# Patient Record
(demographics unavailable — no encounter records)

---

## 2024-12-05 NOTE — HISTORY OF PRESENT ILLNESS
[de-identified] : 59 y/o F with HLD, pre-DM, obesity, and papillary thyroid cancer s/p excision with hypothyroidism, presenting for f/u. Feeling well with no complaints.

## 2024-12-05 NOTE — INTERPRETER SERVICES
[Other: ______] : provided by ALBERTO [Time Spent: ____ minutes] : Total time spent using  services: [unfilled] minutes. The patient's primary language is not English thus required  services. [Interpreters_IDNumber] : [id number] [Interpreters_FullName] : Ritesh [TWNoteComboBox1] : Ecuadorean

## 2024-12-05 NOTE — ASSESSMENT
[FreeTextEntry1] : . 61 y/o F with HLD, pre-DM, obesity, and papillary thyroid cancer s/p excision with hypothyroidism, presenting for f/u. HPI as above.  # HTN; controlled - home SBP 120s/70-80 - low salt diet, exercise as tolerated - c/w losartan 50mg po daily  # HLD - f/u lipid panel after increase dose - c/w atorvastatin 40mg po daily for now - counselled on diet/exercise/weight loss  # pre-DM - f/u A1c - counselled on diet/exercise/weight loss  # obesity - seen by dietician - counselled on diet/exercise/weight loss - pt amenable to medical weight loss eval  # Osteopenia # Vit D insufficiency - weight bearing exercises as tolerated; already seen by PT - daily calcium intake 1200mg via diet/supplement - Vit D 1000 IU daily - fall precautions  # Papillary thyroid cancer s/p excision with hypothyroidism - endo f/u encouraged - c/w current dose synthroid for now  # Intermittent palpitations; resolved # atypical CP; resolved  # L knee pain s/p fall; improved - Xray with small osteophyte, no fracture - seen by ortho, rec conservative management with PT and NSAIDs for now  # Chronic R foot/ankle pain; improved - podiatry eval appreciated, pt has CAM boot  # HCM - 1/2024 mammo BIRADS 3; repeat imaging 7/2024 stable with rec f/u routine imaging 1/2025 - GI eval scheduled, last scope 2015 and told to repeat - 2/2024 pap wnl - s/p flu vaccine  f/u in 4 months.

## 2025-01-06 NOTE — ASSESSMENT
[FreeTextEntry1] : 59 y/o F with HLD, pre-DM, obesity, and papillary thyroid cancer s/p total thyroidectomy with postop hypothyroidism. Here for postop management.  Low risk PTC S/P total thyroidectomy and VALENTINE Postop hypothyroidism Postop hypoparathyroidism? Labs and imaging as above. -Repeat thyroid US now -Check CMP, phos, mag, PTH now as may have postop hypopara. May need calcium supplementation. -TFTs at likely goal though still awaiting US. TG undetectable with negative TG ab. -Continue levothyroxine 112mcg daily x6 days per week. TSH goal will likely be 0.5-2.0 based on response to cancer therapy (US and TG)  Pre-DM HTN HLD Medications include 800 units vitamin D3 daily with adequate 25-hydroxy vitamin D, losartan 50 mg daily and atorvastatin 40 mg daily. Continue these. F/u with PCP  RTC 6 months  Jj Jimenez DO.

## 2025-01-06 NOTE — PHYSICAL EXAM
[Alert] : alert [Well Nourished] : well nourished [No Acute Distress] : no acute distress [EOMI] : extra ocular movement intact [No Proptosis] : no proptosis [No Lid Lag] : no lid lag [No Respiratory Distress] : no respiratory distress [No Accessory Muscle Use] : no accessory muscle use [Normal Rate and Effort] : normal respiratory rate and effort

## 2025-01-06 NOTE — REASON FOR VISIT
[Follow - Up] : a follow-up visit [Thyroid Cancer] : thyroid cancer [Other: ______] : provided by ALBERTO [Interpreters_IDNumber] : [id number] [Interpreters_FullName] : Brigitte [TWNoteComboBox1] : [place] Islander

## 2025-01-06 NOTE — HISTORY OF PRESENT ILLNESS
[FreeTextEntry1] : 61 y/o F with HLD, pre-DM, obesity, and papillary thyroid cancer s/p total thyroidectomy with postop hypothyroidism. Here for postop management.  Patient underwent total thyroidectomy with central neck dissection in December 2016.  Surgical pathology showed unifocal, classic papillary thyroid carcinoma of the right lobe measuring 0.8 cm.  Margins uninvolved by carcinoma, unclear if angioinvasion or lymphatic invasion but no extrathyroidal extension identified.  Metastasis identified in 1 central neck lymph node, largest focus 7 mm.  No extranodal extension identified.  Chronic lymphocytic thyroiditis also identified.  Parathyroid sample normal.  Pathologic staging pT1a pN1b. This was a low risk cancer.  She completed Thyrogen stimulated remnant ablation on May 24, 2017 at [facility].  She received 100 mCi I-131 orally.  On May 31, 2017, she presented for posttherapy imaging whole-body scan, which showed faintly increased radiopharmaceutical uptake in the anterior neck/thyroid bed.  No evidence of distant functioning metastasis.  Most recent thyroid ultrasound from March 2020 showed no evidence of disease.  TSH 3.17 in November 2023, 1.09 in Sept 2024.  Thyroglobulin Sept 2024 undetectable with negative thyroglobulin antibodies.  Patient is currently taking levothyroxine 112 mcg 6 days/week. Taking first thing in the morning on an empty stomach. No calcium or iron use. No use of biotin or collagen.  No compressive or hyperthyroid symptoms. No carpopedal spasms, no numbness or tingling.  Other medications include 800 units vitamin D3 daily with adequate 25-hydroxy vitamin D, losartan 50 mg daily and atorvastatin 40 mg daily.

## 2025-02-10 NOTE — REASON FOR VISIT
[Consultation] : a consultation visit [Language Line ] : provided by Language Line   [Time Spent: ____ minutes] : Total time spent using  services: [unfilled] minutes. The patient's primary language is not English thus required  services. [Interpreters_IDNumber] : [id number] [Interpreters_FullName] : Nellie [TWNoteComboBox1] : Tanzanian

## 2025-02-10 NOTE — PHYSICAL EXAM
[Alert] : alert [No Acute Distress] : no acute distress [Conjunctiva] : the conjunctiva were normal in both eyes [PERRL] : pupils were equal in size, round, and reactive to light [EOM Intact] : extraocular movements were intact [Normal Appearance] : the appearance of the neck was normal [Rate ___] : at [unfilled] breaths per minute [Normal Rhythm/Effort] : normal respiratory rhythm and effort [Clear Bilaterally] : the lungs were clear to auscultation bilaterally [Normal to Percussion] : the lungs were normal to percussion [Normal Rate] : normal [Heart Rate ___] : [unfilled] bpm [Normal S1] : normal S1 [Normal S2] : normal S2 [No Murmur] : no murmurs heard [No Pitting Edema] : no pitting edema present [2+] : left 2+ [No Abnormalities] : the abdominal aorta was not enlarged and no bruit was heard [Rounded] : rounded [Soft, Nontender] : the abdomen was soft and nontender [No Mass] : no masses were palpated [No HSM] : no hepatosplenomegaly noted [None] : no CVA tenderness [No CVA Tenderness] : no CVA  tenderness [Normal Station and Gait] : the gait and station were normal [Normal Motor Tone] : the muscle tone was normal [Involuntary Movements] : no involuntary movements were seen [Normal Scalp] : inspection of the scalp showed no abnormalities [Examination Of The Hair] : texture and distribution of hair was normal [Complexion Medium] : medium complexion [Cranial Nerves Intact] : cranial nerves 2-12 were intact [Sensation] : the sensory exam was normal to light touch and pinprick [No Focal Deficits] : no focal deficits [Deep Tendon Reflexes (DTR)] : deep tendon reflexes were 2+ and symmetric [Motor Exam] : the motor exam was normal [Normal] : oriented to person, place, and time [S3] : no S3 [S4] : no S4 [Right Carotid Bruit] : no bruit heard over the right carotid [Left Carotid Bruit] : no bruit heard over the left carotid [Right Femoral Bruit] : no bruit heard over the right femoral artery [Left Femoral Bruit] : no bruit heard over the left femoral artery [Cervical Lymph Nodes Enlarged Posterior Bilaterally] : nodes not enlarged [Supraclavicular Lymph Nodes Enlarged Bilaterally] : nodes not enlarged [Axillary Lymph Nodes Enlarged Bilaterally] : nodes not enlarged [Inguinal Lymph Nodes Enlarged Bilaterally] : nodes not enlarged [Abnormal Color] : normal color and pigmentation [Skin Lesions 1] : no skin lesions were observed [Tattoo - Single] : no tattoos observed [Skin Turgor Decreased] : normal skin turgor [Over the Past 2 Weeks, Have You Felt Down, Depressed, or Hopeless?] : 1.) Over the past 2 weeks, have you felt down, depressed, or hopeless? No [Over the Past 2 Weeks, Have You Felt Little Interest or Pleasure Doing Things?] : 2.) Over the past 2 weeks, have you felt little interest or pleasure doing things? No [de-identified] : tongue normal  [de-identified] : homans negative bilateral

## 2025-02-10 NOTE — CONSULT LETTER
[Dear  ___] : Dear  [unfilled], [Consult Letter:] : I had the pleasure of evaluating your patient, [unfilled]. [( Thank you for referring [unfilled] for consultation for _____ )] : Thank you for referring [unfilled] for consultation for [unfilled] [Please see my note below.] : Please see my note below. [Consult Closing:] : Thank you very much for allowing me to participate in the care of this patient.  If you have any questions, please do not hesitate to contact me. [Sincerely,] : Sincerely, [FreeTextEntry2] : Dr. Matos [FreeTextEntry3] : Rudy Butler MDFACP

## 2025-02-10 NOTE — ASSESSMENT
[FreeTextEntry1] : 1 colon cancer screening   WE discussed procedure and will return prior for blood testing and instruction.  Colon and rectal cancer screening is a way in which doctors check the colon and rectum for signs of cancer or growths (called polyps) that might become cancer. It is done in people who have no symptoms and no reason to think they have cancer. The goal is to find and remove polyps before they become cancer, or to find cancer early, before it grows, spreads, or causes problems.  The colon and rectum are the last part of the digestive tract (figure 1). When doctors talk about colon and rectal cancer screening, they use the term "colorectal." That is just a shorter way of saying "colon and rectal." It's also possible to say just colon cancer screening.  Studies show that having colon cancer screening lowers the chance of dying from colon cancer. There are several different types of screening test that can do this.  What are the different screening tests for colon cancer?  They include:  ?Colonoscopy  Colonoscopy allows the doctor to see directly inside the entire colon. Before you can have a colonoscopy, you must clean out your colon. You do this at home by drinking a special liquid that causes watery diarrhea for several hours. On the day of the test, you get medicine to help you relax. Then a doctor puts a thin tube into your anus and advances it into your colon (figure 2). The tube has a tiny camera attached to it, so the doctor can see inside your colon. The tube also has tiny tools on the end, so the doctor can remove pieces of tissue or polyps if they are there. After polyps or pieces of tissue are removed, they are sent to a lab to be checked for cancer.   Advantages of this test  Colonoscopy finds most small polyps and almost all large polyps and cancers. If found, polyps can be removed right away. This test gives the most accurate results. If any other screening tests are done first and come back positive, a colonoscopy will need to be done for follow-up. If you have a colonoscopy as your first test, you will probably not need a second follow-up test soon after.   Drawbacks to this test  Colonoscopy has some small risks. It can cause bleeding or tear the inside of the colon, but this only happens in 1 out of 1,000 people. Also, cleaning out the bowel beforehand can be unpleasant. Plus, people usually cannot work or drive for the rest of the day after the test, because of the relaxation medicine they must take during the test.   Sigmoidoscopy  A sigmoidoscopy is similar to a colonoscopy. The difference is that this test looks only at the last part of the colon, and a colonoscopy looks at the whole colon. Before you have a sigmoidoscopy, you must clean out the lower part of your colon using an enema. This bowel cleaning is not as thorough or unpleasant as the one for colonoscopy. For this test, you do not need to take medicines to help you relax, so you can drive and work afterward if you want.   Advantages of this test  Sigmoidoscopy can find polyps and cancers in the rectum and the last part of the colon. If polyps are found, they can be removed right away.   Drawbacks to this test  In about 2 out of 10,000 people, sigmoidoscopy tears the inside of the colon. The test also can't find polyps or cancers that are in the part of the colon the test does not view (figure 3). If doctors find polyps or cancer during a sigmoidoscopy, they usually follow up with a colonoscopy.   CT colonography (also known as virtual colonoscopy or CTC)  CTC looks for cancer and polyps using a special X-ray called a "CT scan." For most CTC tests, the preparation is the same as it is for colonoscopy.   Advantages of this test  CTC can find polyps and cancers in the whole colon without the need for medicines to relax.   Drawbacks to this test  If doctors find polyps or cancer with CTC, they usually follow up with a colonoscopy. CTC sometimes finds areas that look abnormal but that turn out to be healthy. This means that CTC can lead to tests and procedures you did not need. Plus, CTC exposes you to radiation. In most cases, the preparation needed to clean the bowel is the same as the one needed for a colonoscopy. The test is expensive, and some insurance companies might not cover this test for screening.   Stool test for blood  "Stool" is another word for bowel movements. Stool tests most commonly check for blood in samples of stool. Cancers and polyps can bleed, and if they bleed around the time you do the stool test, then blood will show up on the test. The test can find even small amounts of blood that you can't see in your stool. Other less serious conditions can also cause small amounts of blood in the stool, and that will show up in this test. You will have to collect small samples from your bowel movements, which you will put in a special container you get from your doctor or nurse. Then you follow the instructions to mail the container out for the testing.   Advantages of this test  This test does not involve cleaning out the colon or having any procedures.   Drawbacks to this test  Stool tests are less likely to find polyps than other screening tests. These tests also often come up abnormal even in people who do not have cancer. If a stool test shows something abnormal, doctors usually follow up with a colonoscopy.   Stool DNA test  The stool DNA test checks for genetic markers of cancer, as well as for signs of blood. For this test, you get a special kit in order to collect a whole bowel movement. Then you follow the instructions about how and where to ship it.   Advantages of this test  This test does not involve cleaning out the colon or having any procedures. When cancer is not present, it is less likely to be falsely abnormal than a stool test for blood. That means it leads to fewer unnecessary colonoscopies.   Drawbacks to this test  It might be unpleasant to collect and ship a whole bowel movement. If a DNA test shows something abnormal, doctors usually follow up with a colonoscopy.   There is no blood test that most experts think is accurate enough to use for screening.  How do I choose which test to have?  Work with your doctor or nurse to decide which test is best for you. Some doctors might choose to combine screening tests, for example, sigmoidoscopy plus stool testing for blood. Being screenedno matter howis more important than which test you choose.  Who should be screened for colon cancer?  Doctors recommend that most people begin having colon cancer screening at age 50. People who have an increased risk of getting colon cancer sometimes begin screening at a younger age. That might include people with a strong family history of colon cancer, and people with diseases of the colon called "Crohn's disease" and "ulcerative colitis."  Most people can stop being screened around the age of 75, or at the latest 85.  How often should I be screened?  That depends on your risk of colon cancer and which test you have. People who have a high risk of colon cancer often need to be tested more often and should have a colonoscopy.  Most people are not at high risk, so they can choose one of these schedules:  Colonoscopy every 10 years  CT colonography (CTC) every 5 years  Sigmoidoscopy every 5 to 10 years  Stool testing for blood once a year  Stool DNA testing every 3 years (but doctors are not yet sure of the best time frame) 2 preop  Pt is having a low risk procedure and is low risks for cardiac adverse outcome and cri is 0.4% .  she was explained the procedures  colonoscopy, anesthesia   risks and complications alternatives , prep and post procedure care.  I have answered all her questions.  she will have blood testing today  . The risks, benefits, and alternatives were explained in detail to the patient. Risks including but not limited to bleeding, perforation, adverse reaction to medications, cardiopulmonary compromise and their attendant sequalae explained. Sequelae including but not limited to need for surgery, colostomy, prolonged hospital stay, placement of drainage tubes, blood transfusions, disability, morbidity and mortality was explained.  It has been made clear to the patient that although colonoscopy is still considered the best test to screen for colon cancer and polyps, no test is 100% accurate. He/she indicated understanding of the above and wishes to proceed.  All questions and concerns have been addressed.   3 dyspepsia  discussed Rule of 2's; pt should avoid eating too much; too fast; too spicy; too lousy; less than two hours before bed  -Things to avoid including overeating, spicy foods, tight clothing, eating within three hours of bed, this list is not all inclusive.  -For treatment of reflux, possible options discussed including diet control, H2 blockers, PPIs, as well as coating motility agents discussed as treatment options. Timing of meals and proximity of last meal to sleep were discussed. If symptoms persist, a formal gastrointestinal evaluation is needed.  medication on Thursday hole metformin  on Friday hold metformin until after procedure and take with sip of water 3 hrs prior to procedure the losartan and levothyroxine.

## 2025-03-25 NOTE — HISTORY OF PRESENT ILLNESS
[de-identified] : Sandy Bernardo is a 60 year-old Congolese-speaking female presents for follow up.   She is s/p total thyroidectomy with central neck dissection on 12/8/16.  Pathology demonstrates a 0.8 cm papillary thyroid cancer of the right lobe in the setting of chronic lymphocytic thyroiditis. 1/2 examined lymph nodes were + for metastatic disease (T1aN1). Resection margins were negative.     She ultimately completed VALENTINE with Dr. Byers on 5/4/17. A treatment TBI scan performed on 5/31/17 revealed iodine avid tissue in the anterior neck/thyroid bed, but no evidence of distant functioning metastases.   thyroid US 1/2025 - normal neck US s/p total thyroidectomy  On Levothyroxine 112 mcg under Dr. Matos

## 2025-03-25 NOTE — PHYSICAL EXAM
[Normal Supraclavicular Lymph Nodes] : normal supraclavicular lymph nodes [Normal Neck Lymph Nodes] : normal neck lymph nodes  [Normal] : oriented to person, place and time, with appropriate affect [de-identified] : collar incision without any local recurrence

## 2025-03-25 NOTE — HISTORY OF PRESENT ILLNESS
[de-identified] : Sandy Bernardo is a 60 year-old Slovenian-speaking female presents for follow up.   She is s/p total thyroidectomy with central neck dissection on 12/8/16.  Pathology demonstrates a 0.8 cm papillary thyroid cancer of the right lobe in the setting of chronic lymphocytic thyroiditis. 1/2 examined lymph nodes were + for metastatic disease (T1aN1). Resection margins were negative.     She ultimately completed VALENTINE with Dr. Byers on 5/4/17. A treatment TBI scan performed on 5/31/17 revealed iodine avid tissue in the anterior neck/thyroid bed, but no evidence of distant functioning metastases.   thyroid US 1/2025 - normal neck US s/p total thyroidectomy  On Levothyroxine 112 mcg under Dr. Matos

## 2025-03-25 NOTE — ASSESSMENT
[FreeTextEntry1] : IMP: T1aN1 papillary thyroid cancer, s/p total thyroidectomy 12/8/16 Completed VALENTINE - MARK  thyroid US 1/2025 - normal neck US s/p total thyroidectomy  On Levothyroxine 112 mcg under Dr. Matos  PLAN: RTO yearly  Levothyroxine adjustments as per Dr. Matos

## 2025-03-25 NOTE — PHYSICAL EXAM
[Normal Supraclavicular Lymph Nodes] : normal supraclavicular lymph nodes [Normal Neck Lymph Nodes] : normal neck lymph nodes  [Normal] : oriented to person, place and time, with appropriate affect [de-identified] : collar incision without any local recurrence

## 2025-07-07 NOTE — REASON FOR VISIT
[Follow - Up] : a follow-up visit [Hypothyroidism] : hypothyroidism [Thyroid Cancer] : thyroid cancer [Other: ______] : provided by ALBERTO [Interpreters_IDNumber] : [id number] [Interpreters_FullName] : Fatemeh [TWNoteComboBox1] : [place]

## 2025-07-07 NOTE — HISTORY OF PRESENT ILLNESS
[FreeTextEntry1] : 61 y/o F with HLD, pre-DM, obesity, and papillary thyroid cancer s/p total thyroidectomy with postop hypothyroidism. Here for postop management.  Patient underwent total thyroidectomy with central neck dissection in December 2016.  Surgical pathology showed unifocal, classic papillary thyroid carcinoma of the right lobe measuring 0.8 cm.  Margins uninvolved by carcinoma, unclear if angioinvasion or lymphatic invasion but no extrathyroidal extension identified.  Metastasis identified in 1 central neck lymph node, largest focus 7 mm.  No extranodal extension identified.  Chronic lymphocytic thyroiditis also identified.  Parathyroid sample normal.  Pathologic staging pT1a pN1b. This was a low risk cancer.  She completed Thyrogen stimulated remnant ablation on May 24, 2017 at [facility].  She received 100 mCi I-131 orally.  On May 31, 2017, she presented for posttherapy imaging whole-body scan, which showed faintly increased radiopharmaceutical uptake in the anterior neck/thyroid bed.  No evidence of distant functioning metastasis.  Most recent thyroid ultrasound from Jan 2025 showed no evidence of disease.  TSH 3.17-->1.09-->2.84.  Thyroglobulin June 2025 undetectable with negative thyroglobulin antibodies.  Patient is currently taking levothyroxine 112 mcg 6 days/week. Has been taking this regimen for years. Taking first thing in the morning on an empty stomach. No calcium or iron use. No use of biotin or collagen.  No compressive or hyperthyroid symptoms. No carpopedal spasms, no numbness or tingling.  Other medications include 800 units vitamin D3 daily with adequate 25-hydroxy vitamin D, losartan 50 mg daily and atorvastatin 40 mg daily. Is taking calcium but is unsure of strength she is taking.

## 2025-07-07 NOTE — ASSESSMENT
[FreeTextEntry1] : 61 y/o F with HLD, pre-DM, obesity, and papillary thyroid cancer s/p total thyroidectomy with postop hypothyroidism. Here for postop management.  Low risk PTC S/P total thyroidectomy and VALENTINE with excellent response Postop hypothyroidism Postop hypoparathyroidism? Labs and imaging as above. -Repeat thyroid US periodically -TFTs at likely goal though still awaiting US. TG undetectable with negative TG ab. -Continue levothyroxine 112mcg daily x6 days per week. TSH goal 0.5-2.0. Encouraged consistency in administration technique. -Continue current calcium supplementation, not prescribed by me, unclear what strength is. Goal is to maintain total serum calcium slightly below normal (up to 0.5 mg/dL below lower limit of normal) to low normal range; maintain serum phosphorus within normal to slightly elevated range; avoid hypercalciuria (24-hour urine calcium should be <300 mg/day); keep calcium-phosphate product <55 mg/dL or 4.4 mmol/L.  Pre-DM HTN HLD High Lp(a) Medications include 800 units vitamin D3 daily with adequate 25-hydroxy vitamin D, losartan 50 mg daily and atorvastatin 40 mg daily. Continue these. Recommended family cascade screening for high Lp(a).  Patient without chest pain or SOB. Does get heartburn if eats triggering foods. Lipids persistently elevated on high intensity statin. Zetia won't be enough to reduce the risk. Will likely need PCSK9i. Will get CAC score to guide further management. Had NM stress test at [facility] in 2024 which appears to have been normal. GE showed grade I diastolic dysfunction.  RTC 6 months  Jj Jimenez DO.

## 2025-07-16 NOTE — HISTORY OF PRESENT ILLNESS
[de-identified] : 59 y/o F with HTN, HLD, pre-DM, obesity, and papillary thyroid cancer s/p excision with hypothyroidism, presenting for f/u. + R foot pain again for past 3 months, worse at end of day, had CAM boot in past but not able to wear on regular basis. Better with tylenol.  Otherwise feeling well

## 2025-07-16 NOTE — ASSESSMENT
[FreeTextEntry1] : . 61 y/o F with HTN, HLD, pre-DM, obesity, and papillary thyroid cancer s/p excision with hypothyroidism, presenting for f/u. HPI as above.  # HTN; controlled - home BP 120s/70-80 - low salt diet, exercise as tolerated - c/w losartan 50mg po daily  # HLD - f/u lipid panel after increase dose - c/w atorvastatin 40mg po daily for now - pending CT calcium score with endo - counselled on diet/exercise/weight loss  # pre-DM - f/u A1c - only took metformin for 1 week due to misunderstanding; Amenable to metformin, R/B discussed. - counselled on diet/exercise/weight loss  # obesity - seen by dietician - counselled on diet/exercise/weight loss - pt amenable to medical weight loss eval  # Osteopenia # Vit D insufficiency - repeat DEXA 1/2026 - weight bearing exercises as tolerated; already seen by PT - daily calcium intake 1200mg via diet/supplement - Vit D 1000 IU daily - fall precautions  # Papillary thyroid cancer s/p excision with hypothyroidism - endo f/u  - c/w synthroid as per endo  # Intermittent palpitations; resolved # atypical CP; resolved  # L knee pain s/p fall; improved - Xray with small osteophyte, no fracture - seen by ortho, rec conservative management with PT and NSAIDs for now  # Chronic R foot/ankle pain; worsened again - ortho eval  # HCM - 2/2025 mammo birads 2 - 2/2025 scope w/o polyps and rec 10 year repeat - 2/2024 pap wnl  f/u 3 months.

## 2025-07-16 NOTE — INTERPRETER SERVICES
[Language Line ] : provided by [employer]   [Time Spent: ____ minutes] : Total time spent using  services: [unfilled] minutes. The patient's primary language is not English thus required  services. [Interpreters_IDNumber] : [id number] [Interpreters_FullName] : Carissa [TWNoteComboBox1] : [place]ese

## 2025-07-16 NOTE — HEALTH RISK ASSESSMENT
[0] : 2) Feeling down, depressed, or hopeless: Not at all (0) [PHQ-2 Negative - No further assessment needed] : PHQ-2 Negative - No further assessment needed [RRI2Hfxkg] : 0 [Never] : Never